# Patient Record
Sex: MALE | URBAN - METROPOLITAN AREA
[De-identification: names, ages, dates, MRNs, and addresses within clinical notes are randomized per-mention and may not be internally consistent; named-entity substitution may affect disease eponyms.]

---

## 2018-12-06 ENCOUNTER — TELEPHONE (OUTPATIENT)
Dept: OTHER | Facility: CLINIC | Age: 33
End: 2018-12-06

## 2018-12-06 NOTE — TELEPHONE ENCOUNTER
12/6/2018    Call Regarding Onboarding:     Attempt 1    Message on voicemail     Comments: spouse, 2 child dep      Outreach   SV

## 2018-12-12 NOTE — TELEPHONE ENCOUNTER
12/12/18      Call Regarding Onboarding  P1 Lelia     Attempt 2    Message on voicemail    Comments: 2 dep       Outreach   Isaura Carey

## 2018-12-18 NOTE — TELEPHONE ENCOUNTER
12/18/2018    Call Regarding Onboarding P1 SANTINO    Attempt 3    Message on voicemail     Comments:       Outreach   CWR